# Patient Record
Sex: FEMALE | Race: BLACK OR AFRICAN AMERICAN | Employment: FULL TIME | URBAN - METROPOLITAN AREA
[De-identification: names, ages, dates, MRNs, and addresses within clinical notes are randomized per-mention and may not be internally consistent; named-entity substitution may affect disease eponyms.]

---

## 2019-06-27 ENCOUNTER — HOSPITAL ENCOUNTER (OUTPATIENT)
Dept: PHYSICAL THERAPY | Age: 39
Discharge: HOME | End: 2019-06-27

## 2019-06-27 NOTE — PROGRESS NOTES
[facility] at the [address], [place], [address] [facility] Phone: [phone]      Fax:  [phone] PLAN OF CARE / STATEMENT OF MEDICAL NECESSITY FOR PHYSICAL THERAPY SERVICES Patient Name: Hina Reddy : 1980 Medical  
Diagnosis: No admission diagnoses are documented for this encounter. Treatment Diagnosis: *** Onset Date: *** Referral Source: Azeem Garcia NP Start of Care [facility]): 2019 Prior Hospitalization: See medical history Provider #: [id number] Prior Level of Function: No dizziness prior to onset. Comorbidities: *** Medications: Verified on Patient Summary List  
The Plan of Care and following information is based on the information from the initial evaluation.  
======================================================================== Assessment / key information:  Patient is a 25 y. o. F who presents to [facility] with a dx of dizziness. Pt reports her symptoms began *** ago after ***. Symptoms are worsened by turning her head  and alleviated by rest.  Average reported pain levels are 0/10. Imaging indicates negative per patient. Pt reports she has ringing in her ears and reports her dizziness is disorienting. P Pt would benefit from skilled PT to address her objective and functional limitations to improve ROM, strength, posture, and decrease pain to improve his/her ability to **** at work/home.  
     
======================================================================== Eval Complexity: History: {PT OP History:}Exam:{PT OP Examination:79981} Presentation: {PT OP Presentation:49745} Clinical Decision Making:{PT OP Decision Makin}Overall Complexity:{PT OP Complexity:80249} Problem List: pain affecting function, decrease ROM, decrease strength, impaired gait/ balance, decrease ADL/ functional abilitiies and decrease activity tolerance Treatment Plan may include any combination of the following: Therapeutic exercise, Therapeutic activities, Neuromuscular re-education, Physical agent/modality, Gait/balance training, Manual therapy and Patient education Patient / Family readiness to learn indicated by: asking questions and interestPersons(s) to be included in education: {IM Persons Education:62652} Barriers to Learning/Limitations: {barriers to learning/limitations:43902} Measures taken:   
Patient Goal (s):\"***\" Patient self reported health status: {Outpt PT Rehabilitation Potential:21201} Rehabilitation Potential: good? Short Term Goals: To be accomplished in  3  weeks: 
Short term goals 1. Patient to be (I) and compliant with Initial HEP to prevent further disability. 2. Patient to improve FOTO scores by 7 points to improve functional tolerance. 3. Pt to improve *** ROM by 20% to improve functional tolerance.   
  
Long term Goals 1. Pt to be (I) and compliant with progressive HEP in preparation for D/C.  
2. Pt to increase *** tolerance to 30 minutes to improve functional tolerance. 3. Pt to improve strength of  ***  to 4+/5 to improve lifting tolerance ? Long Term Goals: To be accomplished in  6  weeks: 
 
Frequency / Duration:   Patient to be seen  2-3  times per week for 4-6  weeks: 
Patient / Caregiver education and instruction: activity modification and exercises Therapist Signature: Anatoliy León PT Date: 6/27/2019 Certification Period: *** Time: 10:22 AM  
======================================================================== I certify that the above Physical Therapy Services are being furnished while the patient is under my care. I agree with the treatment plan and certify that this therapy is necessary. Physician Signature:       Date:      Time: 
Please sign and return to [facility]  or you may fax the signed copy [phone] . Thank you.